# Patient Record
Sex: FEMALE | Race: WHITE | NOT HISPANIC OR LATINO | Employment: OTHER | URBAN - NONMETROPOLITAN AREA
[De-identification: names, ages, dates, MRNs, and addresses within clinical notes are randomized per-mention and may not be internally consistent; named-entity substitution may affect disease eponyms.]

---

## 2017-02-23 ENCOUNTER — TELEPHONE (OUTPATIENT)
Dept: CARDIOLOGY | Facility: CLINIC | Age: 82
End: 2017-02-23

## 2017-02-23 NOTE — TELEPHONE ENCOUNTER
----- Message from Natali Boss sent at 2/22/2017  3:10 PM CST -----  PATIENT'S NIECE, MARISOL, CALLED AND STATED SINCE THE PATIENT IS ON 2.5 MG OF NORVASC, SHOULD IT BE PRESCRIBED LIKE THAT? IT'S RX AS 5MG. PLEASE CALL MARISOL BACK [phone]        Called spoke to pt's niece she stated that pt was on norvasc 5mg and pt is only taking 1/2 tab daily and doing well had an appt with Dr Mcbride on 02/22/2017 and her BP was good she wanted to know if this was ok or does she need to take the whole tablet last appt was 04/14/2016

## 2017-02-23 NOTE — TELEPHONE ENCOUNTER
In regard to niece's question about BP medication, it looks like we refilled amlodipine 5 mg 5/2016 and her dose has been decreased to 1/2 tab since that time.  Her BP was very good at 10/2016 visit on 1/2 tab. What actual numbers are they getting at home? If less than 140/90, she can continue to cut 5 mg in 1/2 or we can send a new RX for 2.5 mg daily so she won't have to cut the tablet. Just let me know her preference.

## 2017-02-24 NOTE — TELEPHONE ENCOUNTER
Called spoke to pt's niece Tessa informed her that as long as pt's BP was less than 140/90 then they can cut the Norvasc in half she stated at this time they have plenty of the 5mg and they will just keep cutting in half

## 2017-03-29 ENCOUNTER — CLINICAL SUPPORT (OUTPATIENT)
Dept: CARDIOLOGY | Facility: CLINIC | Age: 82
End: 2017-03-29

## 2017-03-29 DIAGNOSIS — Z95.0 PACEMAKER: Chronic | ICD-10-CM

## 2017-03-29 DIAGNOSIS — I48.0 PAROXYSMAL ATRIAL FIBRILLATION (HCC): ICD-10-CM

## 2017-03-29 PROCEDURE — 93288 INTERROG EVL PM/LDLS PM IP: CPT | Performed by: NURSE PRACTITIONER

## 2017-03-29 NOTE — PROGRESS NOTES
Pacemaker Evaluation Report    March 29, 2017    Indication for pacemaker: sinus node dysfunction - chronotropic incompetence    Implanting MD: Dr. Bain    : Medtronic  Model: Adapta [id number]    Implant Date: 1/23/09    Reason For Evaluation: routine      DIAGNOSTIC DATA    Atrial paced: 99.1% Ventricular paced: less than 0.1%    Battery status: satisfactory  Voltage: 2.69V  Estimated battery life: less than 1 to 20 months (estimated 11 months)      FINAL PARAMETERS    Changes made: none    Conclusions: stable pacing and sensing thresholds and battery possibly nearing JIMENEZ, will intensify follow up    Return in about 6 weeks (around 5/10/2017) for Pacemaker Clinic.       Leslie Kapadia, APRN, ACNP-BC, CHFN-BC

## 2017-05-08 ENCOUNTER — CLINICAL SUPPORT (OUTPATIENT)
Dept: CARDIOLOGY | Facility: CLINIC | Age: 82
End: 2017-05-08

## 2017-05-08 DIAGNOSIS — I48.0 PAROXYSMAL ATRIAL FIBRILLATION (HCC): ICD-10-CM

## 2017-05-08 DIAGNOSIS — Z95.0 PACEMAKER: Chronic | ICD-10-CM

## 2017-05-08 PROCEDURE — 93288 INTERROG EVL PM/LDLS PM IP: CPT | Performed by: NURSE PRACTITIONER

## 2017-06-23 RX ORDER — AMLODIPINE BESYLATE 5 MG/1
5 TABLET ORAL DAILY
Qty: 90 TABLET | Refills: 3 | Status: SHIPPED | OUTPATIENT
Start: 2017-06-23 | End: 2018-06-23

## 2017-08-14 ENCOUNTER — CLINICAL SUPPORT (OUTPATIENT)
Dept: CARDIOLOGY | Facility: CLINIC | Age: 82
End: 2017-08-14

## 2017-08-14 DIAGNOSIS — Z95.0 PACEMAKER: Chronic | ICD-10-CM

## 2017-08-14 PROCEDURE — 93288 INTERROG EVL PM/LDLS PM IP: CPT | Performed by: NURSE PRACTITIONER

## 2017-08-14 NOTE — PROGRESS NOTES
Pacemaker Evaluation Report    August 14, 2017    Here today for battery check (nearing JIMENEZ)    Indication for pacemaker: sinus node dysfunction - chronotropic incompetence    Implanting MD: Dr. Bain    : Medtronic  Model: Adapta [id number]     Implant Date: 1/23/09    Reason For Evaluation: routine      DIAGNOSTIC DATA    Atrial paced: 99.4% Ventricular paced: less than 0.1%    Battery status: satisfactory  Voltage: 2.66 V  Estimated battery life: less than 1 to 6 months      FINAL PARAMETERS    Changes made: none    Conclusions: stable pacing and sensing thresholds and battery nearing JIMENEZ, will intensify follow up to monthly    Return in about 1 month (around 9/14/2017) for Pacemaker Clinic. and then will try via [employer]. Will have ming Mackey download sheela and have Emy send her LiveTop monitor. Discussed generator replacement procedure.       Leslie Kapadia, APRN, ACNP-BC, CHFN-BC

## 2017-09-11 ENCOUNTER — TELEPHONE (OUTPATIENT)
Dept: CARDIOLOGY | Facility: CLINIC | Age: 82
End: 2017-09-11

## 2017-09-11 NOTE — TELEPHONE ENCOUNTER
I received a call from patient's niece Tessa. She wanted to know if it was necessary for her to come to her October office visit since she is doing carelink checks at home. I tried explaining that this is a routine office visit that has nothing to do with her pacemaker and recommended that she come in for her October appointment. She stated this was not my place to decide and would like to speak with you directly about her aunt's needs. She has to be transported and it is costly.    Tessa

## 2017-09-11 NOTE — TELEPHONE ENCOUNTER
Returned niece's call but no answer. Left voicemail for her to return my call so we can discuss her appts.  I have decreased her from q 6 months to yearly and arranged for pacer to be checked from home to decrease need for transport to office as much as possible. If Home Health can check an EKG once a year, we could possibly extend office visits to every two years. She is on sotalol so she needs an EKG done.  I am ok with any of our staff relaying this message.

## 2017-09-14 ENCOUNTER — CLINICAL SUPPORT (OUTPATIENT)
Dept: CARDIOLOGY | Facility: CLINIC | Age: 82
End: 2017-09-14

## 2017-09-14 ENCOUNTER — TELEPHONE (OUTPATIENT)
Dept: CARDIOLOGY | Facility: CLINIC | Age: 82
End: 2017-09-14

## 2017-09-14 DIAGNOSIS — Z95.0 PACEMAKER: Chronic | ICD-10-CM

## 2017-09-14 PROCEDURE — 93296 REM INTERROG EVL PM/IDS: CPT | Performed by: PHYSICIAN ASSISTANT

## 2017-09-14 PROCEDURE — 93294 REM INTERROG EVL PM/LDLS PM: CPT | Performed by: PHYSICIAN ASSISTANT

## 2017-09-14 NOTE — TELEPHONE ENCOUNTER
[employer] Rep look over transmission and told her that she should send one in 2 Months because battery was near JIMENEZ.

## 2017-10-06 ENCOUNTER — TELEPHONE (OUTPATIENT)
Dept: CARDIOLOGY | Facility: CLINIC | Age: 82
End: 2017-10-06

## 2017-10-06 NOTE — TELEPHONE ENCOUNTER
Tessa is needing to speak with you regarding Kirsten.  She would not tell me what it was regarding.

## 2017-10-06 NOTE — TELEPHONE ENCOUNTER
She wants to move her appt out a few months until she gets her moved closer which is fine.  Call Tessa to reschedule. She also had pacer check questions which I answered - will continue monthly battery checks. TX!

## 2017-10-17 ENCOUNTER — TELEPHONE (OUTPATIENT)
Dept: CARDIOLOGY | Facility: CLINIC | Age: 82
End: 2017-10-17

## 2017-10-17 NOTE — TELEPHONE ENCOUNTER
Tessa, patient's niece, called and stated no one notified her that the patient's Carelink was scheduled for 10/18/17 and she just received the [employer] message. She was told in September by Leslie Kapadia that she would like for the patient to have a Carelink check monthly. Since no one notified, she thought she could just do the transmission whenever. She said she did one on 10/11 and received a green check luis manuel that it went through. She would like to know if this Carelink was received. If not, it needs to be rescheduled because she is not available to do it tomorrow morning.    Tessa requested a call back tomorrow at . She will not be available by phone this afternoon.    Thanks

## 2017-10-18 ENCOUNTER — CLINICAL SUPPORT (OUTPATIENT)
Dept: CARDIOLOGY | Facility: CLINIC | Age: 82
End: 2017-10-18

## 2017-10-18 DIAGNOSIS — Z95.0 PACEMAKER: Chronic | ICD-10-CM

## 2017-10-18 PROCEDURE — 93296 REM INTERROG EVL PM/IDS: CPT | Performed by: PHYSICIAN ASSISTANT

## 2017-10-18 PROCEDURE — 93294 REM INTERROG EVL PM/LDLS PM: CPT | Performed by: PHYSICIAN ASSISTANT

## 2017-10-19 NOTE — PROGRESS NOTES
Dual Chamber Pacemaker Evaluation Report  [facility]    October 19, 2017    Primary Cardiologist: Cynthia  : Medtronic Model: Adapta  Implant date: 1/23/09    Reason for evaluation: battery check  Indication for pacemaker: sick sinus syndrome      Diagnostic Data  Other: Battery check only.  Reviewed other data.  No significant arrhythmias.  Normal device function.  Battery status: intensify follow up   Will follow closely.    Follow up: 6 months

## 2017-10-30 ENCOUNTER — TELEPHONE (OUTPATIENT)
Dept: CARDIOLOGY | Facility: CLINIC | Age: 82
End: 2017-10-30

## 2017-10-30 NOTE — TELEPHONE ENCOUNTER
----- Message from Leslie Avila PA-C sent at 10/19/2017  8:09 AM CDT -----  F/u  One month for battery check.  ----- Message -----     From: Nhi Avendano CMA     Sent: 10/18/2017  10:02 AM       To: Leslie Avila PA-C

## 2017-11-08 ENCOUNTER — TELEPHONE (OUTPATIENT)
Dept: CARDIOLOGY | Facility: CLINIC | Age: 82
End: 2017-11-08

## 2017-11-08 NOTE — TELEPHONE ENCOUNTER
Patient Emergency Contact Tessa Called and ask when she sends Patients Transmission I told her on the 17th and I can scan it in on the 20th and after that we will do December 20th. She asked what hermann of anesthesia would be used for the Gen change when its time I told her I was not sure about that answer but I would get back to her.

## 2017-11-27 ENCOUNTER — CLINICAL SUPPORT (OUTPATIENT)
Dept: CARDIOLOGY | Facility: CLINIC | Age: 82
End: 2017-11-27

## 2017-11-27 DIAGNOSIS — Z95.0 PACEMAKER: Chronic | ICD-10-CM

## 2018-01-22 ENCOUNTER — TELEPHONE (OUTPATIENT)
Dept: CARDIOLOGY | Facility: CLINIC | Age: 83
End: 2018-01-22

## 2018-01-22 ENCOUNTER — PREP FOR SURGERY (OUTPATIENT)
Dept: OTHER | Facility: HOSPITAL | Age: 83
End: 2018-01-22

## 2018-01-22 ENCOUNTER — OFFICE VISIT (OUTPATIENT)
Dept: CARDIOLOGY | Facility: CLINIC | Age: 83
End: 2018-01-22

## 2018-01-22 DIAGNOSIS — Z45.018 ELECTIVE REPLACEMENT INDICATED FOR PACEMAKER: Primary | ICD-10-CM

## 2018-01-22 DIAGNOSIS — Z95.0 PACEMAKER: Chronic | ICD-10-CM

## 2018-01-22 PROCEDURE — 93296 REM INTERROG EVL PM/IDS: CPT | Performed by: PHYSICIAN ASSISTANT

## 2018-01-22 PROCEDURE — 93294 REM INTERROG EVL PM/LDLS PM: CPT | Performed by: PHYSICIAN ASSISTANT

## 2018-01-22 NOTE — PROGRESS NOTES
Dual Chamber Pacemaker Evaluation Report  [facility]    January 22, 2018    Primary Cardiologist: Cynthia  : Medtronic Model: Adapta  Implant date: 1/23/09    Reason for evaluation: battery check  Indication for pacemaker: sick sinus syndrome    Measurements  Ventricular sensing - R wave: n/r  Ventricular threshold: n/r  Ventricular lead impedance:   454 ohms     Diagnostic Data    Ventricular paced: 0.1 %  Other: no new episodes  Battery status: replace pacer      Final Parameters  Mode:  VVI  Lower rate: 65 bpm      Ventricular - Amplitude: 1.5 V  Pulse width: 0.4 ms  Sensitivity: 2.8 mV    Changes made: n/a  Conclusions: normal pacemaker function    Follow up: gen lewis     I have reviewed and agree with documentation.

## 2018-01-22 NOTE — PROGRESS NOTES
I have reviewed the notes, assessments, and/or procedures performed by Leslie Avila, I concur with her/his documentation of Kirsten Hernández.

## 2018-01-22 NOTE — TELEPHONE ENCOUNTER
Patient's niece Tessa called and said that patient is supposed to have a procedure done, but she is wanting to talk to you about it and get your advice first.  She did not say what kind of procedure that the patient was going to have done.  She would like you to call back on her cell.

## 2018-01-22 NOTE — TELEPHONE ENCOUNTER
Called pt's niece and discussed generator change.  She prefers to have done in [place] and thinks pt has seen Dr. Fuchs in the past and would like for him to do gen change.  Referral ordered.

## 2018-01-23 NOTE — TELEPHONE ENCOUNTER
Coordinated with Lillie in [place], she notified me that the patient does want to go to [facility] instead of [place] to have this done, therefore I called Dr. Fuchs's office to schedule.  They did not answer so I left a msg for their office to call me back and schedule.

## 2018-02-15 ENCOUNTER — TELEPHONE (OUTPATIENT)
Dept: CARDIOLOGY | Facility: CLINIC | Age: 83
End: 2018-02-15

## 2018-02-26 ENCOUNTER — TELEPHONE (OUTPATIENT)
Dept: CARDIOLOGY | Facility: CLINIC | Age: 83
End: 2018-02-26

## 2018-02-26 NOTE — TELEPHONE ENCOUNTER
Called patients niece to setup a Carelink transmission for April 13th because patient is not going to have an OV six weeks after GEN change.

## 2018-04-09 ENCOUNTER — TELEPHONE (OUTPATIENT)
Dept: CARDIOLOGY | Facility: CLINIC | Age: 83
End: 2018-04-09

## 2018-04-09 NOTE — TELEPHONE ENCOUNTER
Where in [facility]? I would recommend one of the [facility] cardiologists (Dr. Ruslan Fleming, Dr. Kirk Adler, or Dr. Dee Pedraza) if they are still coming to [place]. Let me know if Padmini and I'll put in the referral. TX!

## 2018-05-07 ENCOUNTER — CLINICAL SUPPORT (OUTPATIENT)
Dept: CARDIOLOGY | Facility: CLINIC | Age: 83
End: 2018-05-07

## 2018-05-07 DIAGNOSIS — I49.5 SSS (SICK SINUS SYNDROME) (HCC): ICD-10-CM

## 2018-05-07 DIAGNOSIS — Z95.0 PACEMAKER: Primary | Chronic | ICD-10-CM

## 2018-05-07 PROCEDURE — 93294 REM INTERROG EVL PM/LDLS PM: CPT | Performed by: INTERNAL MEDICINE

## 2018-05-07 PROCEDURE — 93296 REM INTERROG EVL PM/IDS: CPT | Performed by: INTERNAL MEDICINE

## 2018-05-08 NOTE — PROGRESS NOTES
I have reviewed the notes, assessments, and/or procedures performed by Brittney Mackey, I concur with her/his documentation of Kirsten Hernández.

## 2018-05-08 NOTE — PROGRESS NOTES
Dual Chamber Pacemaker Evaluation Report  REMOTE/CARELINK    MAY 7, 2018    Primary Cardiologist: Cynthia  : Medtronic Model: Adapta  Implant date: 02/13/2018    Reason for evaluation: Initial Carelink Report  Indication for pacemaker: sick sinus syndrome    Measurements  Atrial sensing - P wave: 1-2 mV  Atrial threshold: 1.5V@ 0.4ms  Atrial lead impedance: 440 ohms  Ventricular sensing - R wave: 8-16 mV  Ventricular threshold: 0.75 V @ 0.4 ms  Ventricular lead impedance:   478 ohms     Diagnostic Data  Atrial paced: 79.4 %  Ventricular paced: 0.3 %    Episodes since 02/13/2018  363 Atrial High Rate episodes:  Longest duration 30 minutes 16 seconds; max ventricular rate during episodes 115 bpm.  All appear to be AF.  AT/AF burden 1.2%; 3 days since implant with > 4 hours AT/AF.  Medications include Xarelto.    Battery status: satisfactory   2.79V, estimated 10 years remaining      Final Parameters  Mode:  AAIR+  Lower rate: 60 bpm   Upper rate: 130 bpm  AV Delay: paced- 150 ms  Sensed-120 ms  Atrial - Amplitude: 3.25 V   Pulse width: 0.4 ms   Sensitivity: 0.35 mV     Ventricular - Amplitude: 2 V  Pulse width: 0.4 ms  Sensitivity: 2.8 mV    Changes made: N/A--Remote transmission  Conclusions: normal pacemaker function, stable pacing and sensing thresholds and adequate battery reserve    Follow up: 6 months remotely